# Patient Record
Sex: FEMALE | Race: WHITE | Employment: FULL TIME | URBAN - METROPOLITAN AREA
[De-identification: names, ages, dates, MRNs, and addresses within clinical notes are randomized per-mention and may not be internally consistent; named-entity substitution may affect disease eponyms.]

---

## 2020-03-17 ENCOUNTER — HOSPITAL ENCOUNTER (EMERGENCY)
Facility: CLINIC | Age: 45
Discharge: HOME | End: 2020-03-17
Attending: EMERGENCY MEDICINE | Admitting: EMERGENCY MEDICINE
Payer: COMMERCIAL

## 2020-03-17 VITALS
WEIGHT: 170 LBS | HEART RATE: 95 BPM | DIASTOLIC BLOOD PRESSURE: 103 MMHG | BODY MASS INDEX: 32.1 KG/M2 | OXYGEN SATURATION: 99 % | TEMPERATURE: 98.4 F | SYSTOLIC BLOOD PRESSURE: 155 MMHG | RESPIRATION RATE: 20 BRPM | HEIGHT: 61 IN

## 2020-03-17 DIAGNOSIS — T78.2XXA ANAPHYLAXIS, INITIAL ENCOUNTER: ICD-10-CM

## 2020-03-17 DIAGNOSIS — T78.40XA ALLERGIC REACTION, INITIAL ENCOUNTER: ICD-10-CM

## 2020-03-17 PROCEDURE — 96374 THER/PROPH/DIAG INJ IV PUSH: CPT

## 2020-03-17 PROCEDURE — 96372 THER/PROPH/DIAG INJ SC/IM: CPT

## 2020-03-17 PROCEDURE — 96375 TX/PRO/DX INJ NEW DRUG ADDON: CPT

## 2020-03-17 PROCEDURE — 25000128 H RX IP 250 OP 636: Performed by: EMERGENCY MEDICINE

## 2020-03-17 PROCEDURE — 25000125 ZZHC RX 250: Performed by: EMERGENCY MEDICINE

## 2020-03-17 PROCEDURE — 99284 EMERGENCY DEPT VISIT MOD MDM: CPT | Mod: 25

## 2020-03-17 RX ORDER — PREDNISONE 20 MG/1
TABLET ORAL
Qty: 10 TABLET | Refills: 0 | Status: SHIPPED | OUTPATIENT
Start: 2020-03-17

## 2020-03-17 RX ORDER — EPINEPHRINE 0.3 MG/.3ML
0.3 INJECTION SUBCUTANEOUS
Qty: 1 EACH | Refills: 1 | Status: SHIPPED | OUTPATIENT
Start: 2020-03-17

## 2020-03-17 RX ORDER — METHYLPREDNISOLONE SODIUM SUCCINATE 125 MG/2ML
125 INJECTION, POWDER, LYOPHILIZED, FOR SOLUTION INTRAMUSCULAR; INTRAVENOUS ONCE
Status: COMPLETED | OUTPATIENT
Start: 2020-03-17 | End: 2020-03-17

## 2020-03-17 RX ORDER — CETIRIZINE HYDROCHLORIDE 10 MG/1
10 TABLET ORAL 2 TIMES DAILY PRN
Qty: 20 TABLET | Refills: 0 | Status: SHIPPED | OUTPATIENT
Start: 2020-03-17 | End: 2020-03-27

## 2020-03-17 RX ORDER — EPINEPHRINE 1 MG/ML
0.3 INJECTION, SOLUTION, CONCENTRATE INTRAVENOUS ONCE
Status: COMPLETED | OUTPATIENT
Start: 2020-03-17 | End: 2020-03-17

## 2020-03-17 RX ADMIN — FAMOTIDINE 20 MG: 10 INJECTION, SOLUTION INTRAVENOUS at 14:31

## 2020-03-17 RX ADMIN — METHYLPREDNISOLONE SODIUM SUCCINATE 125 MG: 125 INJECTION, POWDER, FOR SOLUTION INTRAMUSCULAR; INTRAVENOUS at 14:27

## 2020-03-17 RX ADMIN — EPINEPHRINE 0.3 MG: 1 INJECTION INTRAMUSCULAR; INTRAVENOUS; SUBCUTANEOUS at 14:18

## 2020-03-17 ASSESSMENT — ENCOUNTER SYMPTOMS
FEVER: 0
FACIAL SWELLING: 1
TROUBLE SWALLOWING: 0
COUGH: 1
SORE THROAT: 1

## 2020-03-17 ASSESSMENT — MIFFLIN-ST. JEOR: SCORE: 1363.49

## 2020-03-17 NOTE — ED NOTES
Pt provided with discharge paperwork and educated on recommended follow-up with PCP. Pt educated on sign/symptoms to seek medical attention along with how to take prescribed medications. Pt voiced understanding and denied any questions.

## 2020-03-17 NOTE — ED PROVIDER NOTES
"  History     Chief Complaint:  Allergic Reaction    HPI   Selina Rob is a 44 year old female with a known allergy to peanuts and tree nuts who presents for the evaluation of allergic reaction. The patient reports that just prior to her arrival to the ED she started to experience throat swelling, prompting her to the ED. The patient describes that she also has the sensation of a \"bump\" in her lip since her throat symptoms started. She notes that she took a 25 mg PO dose of Benadryl prior to her arrival. She also notes that she had an episode of diarrhea after her throat symptoms started. She states that she does of have bronchitis currently as well. Patient did not receive Epinephrine prior to arrival. The patient denies rash or itchiness, difficulty swallowing, fever, congestion, and other issues.      Allergies:  No known drug allergies      Medications:    Allegra  Advair     Past Medical History:    The patient does not have any past pertinent medical history.     Past Surgical History:    History reviewed. No pertinent surgical history.     Family History:    History reviewed. No pertinent family history.      Social History:  Smoking status: Never Smoker  Alcohol use: Yes  Drug use: No  PCP: No primary care provider on file.   Presents to the ED alone   Marital Status:  Single [1]     Review of Systems   Constitutional: Negative for fever.   HENT: Positive for facial swelling and sore throat. Negative for congestion and trouble swallowing.         Lip swelling    Respiratory: Positive for cough.    Skin: Negative for rash.   All other systems reviewed and are negative.    Physical Exam     Patient Vitals for the past 24 hrs:   BP Temp Temp src Pulse Heart Rate Resp SpO2 Height Weight   03/17/20 1700 (!) 155/103 -- -- 95 102 -- 99 % -- --   03/17/20 1630 (!) 149/96 -- -- 92 97 -- 99 % -- --   03/17/20 1600 129/71 -- -- 98 96 -- 99 % -- --   03/17/20 1515 -- -- -- -- 103 -- 100 % -- --   03/17/20 1500 " "126/80 -- -- 96 91 -- 100 % -- --   03/17/20 1430 139/88 -- -- 98 96 -- 100 % -- --   03/17/20 1420 (!) 164/97 98.4  F (36.9  C) Oral -- 101 20 100 % 1.549 m (5' 1\") 77.1 kg (170 lb)   03/17/20 1415 (!) 164/97 -- -- -- -- -- 100 % -- --      Physical Exam  General: Alert, appears well-developed and well-nourished. Cooperative.     In mild distress, faint muffled voice (does have hx of vocal cord paralysis, unilateral)  HEENT:  Head:  Atraumatic  Ears:  External ears are normal  Mouth/Throat:  Oropharynx is without erythema or exudate and mucous membranes are moist.   Eyes:   Conjunctivae normal and EOM are normal. No scleral icterus.  CV:  Normal rate, regular rhythm, normal heart sounds and radial pulses are 2+ and symmetric.  No murmur.  Resp:  Breath sounds are clear bilaterally, no wheezing.     Non-labored, no retractions or accessory muscle use  GI:  Abdomen is soft, no distension, no tenderness. No rebound or guarding.  No CVA tenderness bilaterally  MS:  Normal range of motion. No edema.    Normal strength in all 4 extremities.     Back atraumatic.    No midline cervical, thoracic, or lumbar tenderness  Skin:  Warm and dry.  No rash or lesions noted.  Neuro: Alert. Normal strength.  GCS: 15  Psych:  Normal mood and affect.  Lymph: No anterior or posterior cervical lymphadenopathy noted.    Emergency Department Course   Interventions:  1418, Adrenalin, 0.3 mg, Subcutaneous   1427, solu-Medrol, 125 mg, IV  1431, Pepcid, 20 mg, IV    Emergency Department Course:  Past medical records, nursing notes, and vitals reviewed.  1415: I performed an exam of the patient and obtained history, as documented above.     1723: I rechecked the patient. Explained findings to patient.     Findings and plan explained to the Patient. Patient discharged home with instructions regarding supportive care, medications, and reasons to return. The importance of close follow-up was reviewed. The patient was prescribed Zyrtec, " Epinephrine, Deltasone, and Zantac.       Impression & Plan    Medical Decision Making:  The patient presents for signs and symptoms consistent with an allergic reaction.  Based on the severity of presentation, epinephrine was indicated.  Following the administration of epinephrine as well as the above listed medication, the patient was monitored in the emergency department for >3 hours to assure no rebound symptoms.  At the time of discharge, the patient does not have signs of airway compromise and is hemodynamically stable.  Instructions for the use of benadryl and/or zyrtec and prednisone were reviewed.  Return precautions including oral swelling, difficulties breathing, chest pain, syncope were given.  Patient took benadryl PTA and received epinephrine, solumedrol, and pepcid here in the ED.  Discharged home in stable condition after all questions answered.     Diagnosis:    ICD-10-CM    1. Anaphylaxis, initial encounter  T78.2XXA    2. Allergic reaction, initial encounter  T78.40XA        Disposition:  Discharged to home with Zyrtec, Epinephrine, Deltasone, and Zantac.    Discharge Medications:  Discharge Medication List as of 3/17/2020  5:17 PM      START taking these medications    Details   cetirizine (ZYRTEC) 10 MG tablet Take 1 tablet (10 mg) by mouth 2 times daily as needed for allergies (1 tab up to twice a day as needed for itch, rash, hives, allergy), Disp-20 tablet,R-0, Local Print      !! EPINEPHrine (ANY BX GENERIC EQUIV) 0.3 MG/0.3ML injection 2-pack Inject 0.3 mLs (0.3 mg) into the muscle once as needed for anaphylaxis, Disp-1 each,R-1, Local Print      predniSONE (DELTASONE) 20 MG tablet Take two tablets (= 40mg) each day for 5 (five) days, Disp-10 tablet,R-0, Local Print      ranitidine (ZANTAC) 150 MG tablet Take 1 tablet (150 mg) by mouth 2 times daily for 5 days, Disp-10 tablet,R-0, Local Print       !! - Potential duplicate medications found. Please discuss with provider.        Scribe  Disclosure:  I, Sanchez Marsh, am serving as a scribe at 2:19 PM on 3/17/2020 to document services personally performed by Rafael Allen MD based on my observations and the provider's statements to me.      Sanchez Marsh  3/17/2020   [facility] EMERGENCY DEPARTMENT       Rafael Allen MD  03/17/20 2029

## 2020-03-17 NOTE — ED AVS SNAPSHOT
[facility] Emergency Department  [address]  [facility] [address]  Phone:  [phone]  Fax:  [phone]                                    Selina Rob   MRN: [id number]    Department:  [facility] Emergency Department   Date of Visit:  3/17/2020           After Visit Summary Signature Page    I have received my discharge instructions, and my questions have been answered. I have discussed any challenges I see with this plan with the nurse or doctor.    ..........................................................................................................................................  Patient/Patient Representative Signature      ..........................................................................................................................................  Patient Representative Print Name and Relationship to Patient    ..................................................               ................................................  Date                                   Time    ..........................................................................................................................................  Reviewed by Signature/Title    ...................................................              ..............................................  Date                                               Time          22EPIC Rev 08/18

## 2020-03-17 NOTE — ED TRIAGE NOTES
"Noted \"bump\" on upper lip at 1245, feels as though has lump in throat. Allergy to peanuts and tree nuts.  Unsure what she had. Took 50mg benadryl at home.  No rash.  Denies difficulty swallowing or new difficulty breathing. States has hx of paralyzed vocal cords. Reports diarrhea. No noted throat swelling.  Baseline scratchy throat.  A/O x 4, ABCD's intact.   "

## 2020-03-17 NOTE — ED NOTES
Pt ambulated to bathroom. Pt steady on feet. Pt states feeling better. Pt updated on plan of care.

## 2025-06-05 ENCOUNTER — TRANSFERRED RECORDS (OUTPATIENT)
Dept: HEALTH INFORMATION MANAGEMENT | Facility: CLINIC | Age: 50
End: 2025-06-05
Payer: COMMERCIAL

## 2025-06-05 ENCOUNTER — MEDICAL CORRESPONDENCE (OUTPATIENT)
Dept: HEALTH INFORMATION MANAGEMENT | Facility: CLINIC | Age: 50
End: 2025-06-05
Payer: COMMERCIAL